# Patient Record
Sex: FEMALE | Race: BLACK OR AFRICAN AMERICAN
[De-identification: names, ages, dates, MRNs, and addresses within clinical notes are randomized per-mention and may not be internally consistent; named-entity substitution may affect disease eponyms.]

---

## 2018-10-19 NOTE — EMERGENCY ROOM REPORT
History of Present Illness


General


Chief Complaint:  Abdominal Pain


Source:  Patient





Present Illness


HPI


22-year-old female patient presents ER complaining of right lower flank pain 3 

days. . Reports first pregnancy. Patient is currently 10 weeks pregnant.  

Denies vaginal discharge or hematuria.  Denies dysuria. Also complaining of left

-sided flank pain during this time. Reports pain worse with long periods of 

walking.  Also complaining of diarrhea.  Reports diarrhea is watery, denies 

blood in diarrhea.  Denies recent travel.  Denies history of GI problems.  

Denies fever, chest pain, shortness of breath. Denies vomiting.  Reports has 

been seen by OB/GYN, states this is her first pregnancy, states his taking 

prenatal vitamins.  Denies other acute complaints.denies headache. Cristiana 

vagina discharge. Denies hematuria. Denies passage of clots of tissue. Denies 

HA. Denies syncope or dizziness. Denies injury or trauma. Denies pain radiating 

down legs. Denies bowel or bladder incontinence.


Allergies:  


Coded Allergies:  


     No Known Allergies (Unverified , 18)





Patient History


Past Medical History:  see triage record


Last Menstrual Period:  2018


Pregnant Now:  Yes


:  1


Para:  0


Reviewed Nursing Documentation:  PMH: Agreed; PSxH: Agreed





Nursing Documentation-PMH


Past Medical History:  No History, Except For





Review of Systems


All Other Systems:  negative except mentioned in HPI





Physical Exam





Vital Signs








  Date Time  Temp Pulse Resp B/P (MAP) Pulse Ox O2 Delivery O2 Flow Rate FiO2


 


10/19/18 12:43 98.7 70 16 111/61 99 Room Air  





 98.8       








Sp02 EP Interpretation:  reviewed, normal


General Appearance:  well appearing, no apparent distress, alert, GCS 15, non-

toxic


Head:  normocephalic, atraumatic


Eyes:  bilateral eye normal inspection, bilateral eye PERRL


ENT:  hearing grossly normal, normal pharynx, no angioedema, normal voice, 

uvula midline, moist mucus membranes


Neck:  full range of motion


Respiratory:  lungs clear, normal breath sounds, no rhonchi, no respiratory 

distress, no accessory muscle use, no wheezing, speaking full sentences


Cardiovascular #1:  regular rate, rhythm, no edema


Gastrointestinal:  non tender, soft, no mass, non-distended, no guarding, no 

rebound, other - negative obturator, negative Rovsing, no tenderness to 

palpation at McBurney's point


Genitourinary:  no CVA tenderness


Musculoskeletal:  back normal, digits/nails normal, gait/station normal, normal 

range of motion, non-tender, no calf tenderness, Corey's Sign negative


Neurologic:  alert, oriented x3, responsive, motor strength/tone normal, SLR 

negative, sensory intact, cerebellar normal, normal gait, speech normal


Psychiatric:  mood/affect normal


Skin:  no rash


Lymphatic:  no adenopathy





Medical Decision Making


PA Attestation


Dr. Cardona is my supervising Physician whom patient management has been 

discussed with.


Diagnostic Impression:  


 Primary Impression:  


 Abdominal pain during pregnancy in first trimester


 Additional Impression:  


 Diarrhea


ER Course


Pt. presents to the ED c/o abdominal pain, left flank pain, and diarrhea, 

patient is 10 weeks pregnant.





Ddx considered but are not limited to UTI, cholelithiasis, cholecystitis, 

pancreatitis, appendicitis, diverticulitis, constipation, drug use, threatened 

, miscarriage, ectopic.


Begin abdominal pain workup. Provided patient with pain medication.





Vital signs: are WNL, pt. is afebrile





ORDERS:  CBC, CMP, Lipase, UA,  US, IV fluids, and medication.





ER COURSE:


physical exam benign, patient resting comfortably in bed, no acute distress, 

nontoxic appearing.





CBC and CMP normal H&H, no elevation in WBCs


Lipase WNL


UA negative


BHCG 77181


B positive with negative antibody


Discuss results with patient





Abdominal US negative, appendix WNL, kidneys unremarkable, low suspicion for 

appendicitis or nephrolithiasis or hydronephrosis.


pelvic US shows IUP, 





Back pain may be MSK in nature, take Tylenol for pain symptoms, advised on rest

, ice and heat. F/u with PCP for further treatment and referral.


Abdominal pain symptoms may be related to diarrhea. Drink plenty of fluids. 

Take Tylenol for pain symptoms. Does not require abx at this time.





Patient reports relief of pain symptoms while in the ER.


ER precautions given.


F/u with OBGYN.


Do not take Ibuprofen/ Motrin for pain symptoms.


patient resting comfortably in no acute distress, nontoxic appearing, okay for 

outpatient treatment.





DISCHARGE: 


Rx provided for Tylenol





At this time pt. is stable for d/c to home. Patient resting comfortably, in no 

acute distress, nontoxic appearing, talking without difficulty.


Rx provided to patient. Patient to take medications as instructed


Will provide with patient care instructions and any necessary prescriptions.


Care plan and follow-up instructions provided.  Patient instructed to follow-up 

with primary care provider in 3 - 5 days.


Patient questions asked and answered.


Patient reports understanding and agreement to treatment plan.


ER precautions given. Patient instructed to return to ER immediately for any 

new or worsening of symptoms including but not limited to increasing SOB, 

persistent fever, worsening of pain symptoms, intractable vomiting, blood in 

stool, urine, and/or emesis.





- Please note that this Emergency Department Report was dictated using [employer] technology software, occasionally this can lead to 

erroneous entry secondary to interpretation by the dictation equipment.





Labs








Test


  10/19/18


13:20


 


White Blood Count


  9.7 K/UL


(4.8-10.8)


 


Red Blood Count


  5.33 M/UL


(4.20-5.40)


 


Hemoglobin


  13.4 G/DL


(12.0-16.0)


 


Hematocrit


  41.5 %


(37.0-47.0)


 


Mean Corpuscular Volume 78 FL (80-99) 


 


Mean Corpuscular Hemoglobin


  25.1 PG


(27.0-31.0)


 


Mean Corpuscular Hemoglobin


Concent 32.3 G/DL


(32.0-36.0)


 


Red Cell Distribution Width


  14.2 %


(11.6-14.8)


 


Platelet Count


  330 K/UL


(150-450)


 


Mean Platelet Volume


  6.5 FL


(6.5-10.1)


 


Neutrophils (%) (Auto)


  72.9 %


(45.0-75.0)


 


Lymphocytes (%) (Auto)


  18.2 %


(20.0-45.0)


 


Monocytes (%) (Auto)


  6.8 %


(1.0-10.0)


 


Eosinophils (%) (Auto)


  1.2 %


(0.0-3.0)


 


Basophils (%) (Auto)


  1.0 %


(0.0-2.0)


 


Urine Color Pale yellow 


 


Urine Appearance Clear 


 


Urine pH 8 (4.5-8.0) 


 


Urine Specific Gravity


  1.015


(1.005-1.035)


 


Urine Protein


  Negative


(NEGATIVE)


 


Urine Glucose (UA)


  Negative


(NEGATIVE)


 


Urine Ketones


  Negative


(NEGATIVE)


 


Urine Blood


  Negative


(NEGATIVE)


 


Urine Nitrite


  Negative


(NEGATIVE)


 


Urine Bilirubin


  Negative


(NEGATIVE)


 


Urine Urobilinogen


  Normal MG/DL


(0.0-1.0)


 


Urine Leukocyte Esterase


  Negative


(NEGATIVE)


 


Sodium Level


  139 MMOL/L


(136-145)


 


Potassium Level


  4.2 MMOL/L


(3.5-5.1)


 


Chloride Level


  105 MMOL/L


()


 


Carbon Dioxide Level


  22 MMOL/L


(21-32)


 


Anion Gap


  12 mmol/L


(5-15)


 


Blood Urea Nitrogen 5 mg/dL (7-18) 


 


Creatinine


  0.5 MG/DL


(0.55-1.30)


 


Estimat Glomerular Filtration


Rate > 60 mL/min


(>60)


 


Glucose Level


  90 MG/DL


()


 


Calcium Level


  9.3 MG/DL


(8.5-10.1)


 


Total Bilirubin


  0.2 MG/DL


(0.2-1.0)


 


Aspartate Amino Transf


(AST/SGOT) 26 U/L (15-37) 


 


 


Alanine Aminotransferase


(ALT/SGPT) 48 U/L (12-78) 


 


 


Alkaline Phosphatase


  68 U/L


()


 


Total Protein


  7.1 G/DL


(6.4-8.2)


 


Albumin


  3.3 G/DL


(3.4-5.0)


 


Globulin 3.8 g/dL 


 


Albumin/Globulin Ratio 0.9 (1.0-2.7) 


 


Lipase


  76 U/L


()


 


Human Chorionic Gonadotropin,


Quant 31866 mIU/mL


(1-6)








CT/MRI/US Diagnostic Results


CT/MRI/US Diagnostic Results :  


   Imaging Test Ordered:  pelvic and abdominal ultrasound


   Impression


IUP





no torsion





Abdominal US WNL


No kidney stones, no hydronephrosis





per US technician





Last Vital Signs








  Date Time  Temp Pulse Resp B/P (MAP) Pulse Ox O2 Delivery O2 Flow Rate FiO2


 


10/19/18 12:43 98.7 70 16 111/61 99 Room Air  





 98.8       








Status:  improved


Disposition:  HOME, SELF-CARE


Condition:  Stable


Scripts


Acetaminophen* (TYLENOL EXTRA STRENGTH*) 500 Mg Tablet


500 MG ORAL Q8H PRN for Prn Headache/Temp > 101, #30 TAB 0 Refills


   Prov: Vijay Zayas         10/19/18


Referrals:  


NON PHYSICIAN (PCP)


Patient Instructions:  Abdominal Pain During Pregnancy, Back Pain, Adult, Easy-

to-Read, Diarrhea, Adult, Easy-to-Read





Additional Instructions:  


Followup with OBGYN in 1-2 days.


Followup with PCP.


Take medications as directed. Take Tylenol for pain, do not take Ibuprofen.


Drink plenty of fluids to prevent dehydration.


Patient questions asked and answered.


ER precautions given, patient instructed to return to ER immediately for any 

new or worsening of symptoms including but not limited to chest pain, SOB, 

intractable vomiting, profuse vaginal bleeding, abdominal pain.





[facility] [place] [address]


Blood: B positive with negative antibody











Vijay Zayas Oct 19, 2018 13:15

## 2018-10-19 NOTE — DIAGNOSTIC IMAGING REPORT
Indication: Pelvic pain, positive pregnancy test

 

Technique: Transabdominal and transvaginal images of the pelvis. Doppler

interrogation of the ovaries and adnexa.

 

Comparison: none

 

Findings: Uterus measures 12.8 cm length by 5.3 cm AP. Within the endometrium, there

is a gestational sac. There is a yolk sac. There is a fetal pole with a crown-rump

length of 26 mm, corresponding to an assessment gestational age of 9 weeks 2 days.

Estimated gestational age by dates is 10 weeks 3 days. Estimated date of delivery is

5/19/2019 there is positive fetal heart activity, fetal heart rate 163 bpm. No

subchorionic hemorrhage demonstrated. The cervix is closed, endocervical canal

measuring 3.2 cm. Tiny cervical nabothian cyst is noted. There is trace free

cul-de-sac fluid. Left ovary measures 4.4 cm length. Right ovary measures 3.8 cm

length. Both ovaries demonstrate normal flow on Doppler imaging. No myometrial

abnormality 

 

Impression: 9 week 2 day, by crown-rump length measurement, single live intrauterine

pregnancy. No unusual features

 

Trace free cul-de-sac fluid, presumably physiologic

 

Incidental finding small cervical nabothian cyst

## 2018-10-19 NOTE — DIAGNOSTIC IMAGING REPORT
Indication: Abdominal pain

 

Technique: Gray-scale and duplex images of the upper abdomen were obtained. Doppler

interrogation of the hepatic vessels

 

Comparison: none

 

Findings: Gallbladder is unremarkable, without stones, wall thickening, nor

pericholecystic fluid.  Sonographic Chavez's sign is negative.  Common bile duct

measures 4 mm in diameter.  No intrahepatic biliary ductal dilatation.  Liver

demonstrates normal echogenicity, no focal abnormality.   Portal vein and hepatic

veins are patent.   Pancreas is unremarkable.    Spleen is unremarkable.  Left kidney

measures 11.6 cm in length.  Right kidney measures 11.2 cm length.  Both kidneys

demonstrate normal echogenicity.  There is no hydronephrosis.   No focal abnormality

. Non-aneurysmal abdominal aorta .

 

Impression: Negative

## 2018-11-20 NOTE — EMERGENCY ROOM REPORT
History of Present Illness


General


Chief Complaint:  Headache


Source:  Patient, Medical Record





Present Illness


HPI


22-year-old female presents to the emergency department complaining of 

persistent cough, subjective fevers, 6 out of 10 in severity sore throat as 

well as headaches 2 days.  Patient reports ill contact was her  and his 

friends who had similar symptoms several days prior.  Patient states she is up-

to-date with vaccinations except for this year's flu vaccination.  Patient also 

reports that she is 14 weeks pregnant is .  She denies nausea or vomiting.  

Patient reports that when she coughs she has pain in her rib cage as well as 

abdomen.  Denies vaginal bleeding or discharge.  She denies photophobia, neck 

pain or stiffness, ear pain or changes in vision.  He denies unilateral 

weaknesses or paresthesia.


Allergies:  


Coded Allergies:  


     No Known Allergies (Unverified , 18)





Patient History


Past Medical History:  see triage record


Past Surgical History:  none


Pertinent Family History:  none


Last Menstrual Period:  2018


Pregnant Now:  Yes


Reviewed Nursing Documentation:  PMH: Agreed; PSxH: Agreed





Nursing Documentation-PMH


Past Medical History:  No History, Except For





Review of Systems


All Other Systems:  negative except mentioned in HPI





Physical Exam





Vital Signs








  Date Time  Temp Pulse Resp B/P (MAP) Pulse Ox O2 Delivery O2 Flow Rate FiO2


 


18 14:43 98.8 96 15 113/71 100 Room Air  








Sp02 EP Interpretation:  reviewed, normal


General Appearance:  no apparent distress, alert, GCS 15, non-toxic


Head:  normocephalic, atraumatic


Eyes:  bilateral eye normal inspection, bilateral eye PERRL


ENT:  hearing grossly normal, normal voice


Neck:  full range of motion


Respiratory:  chest non-tender, lungs clear, normal breath sounds, no 

respiratory distress, no accessory muscle use, no wheezing, speaking full 

sentences


Cardiovascular #1:  regular rate, rhythm


Gastrointestinal:  non tender, soft


Rectal:  deferred


Genitourinary:  normal inspection, no CVA tenderness


Musculoskeletal:  back normal, gait/station normal, normal range of motion, non-

tender


Neurologic:  alert, oriented x3, responsive, motor strength/tone normal, 

sensory intact, speech normal, grossly normal


Psychiatric:  judgement/insight normal


Skin:  normal color, no rash, warm/dry, well hydrated


Lymphatic:  no adenopathy





Medical Decision Making


PA Attestation


Dr. yoder is my supervising Physician whom patient management has been 

discussed with.


Diagnostic Impression:  


 Primary Impression:  


 Upper respiratory infection, viral


 Additional Impression:  


 Headache


 Qualified Codes:  R51 - Headache


ER Course


22-year-old female presents to the emergency department complaining of 

persistent cough, subjective fevers, 6 out of 10 in severity sore throat as 

well as headaches 2 days.  Patient reports ill contact was her  and his 

friends who had similar symptoms several days prior.  Patient states she is up-

to-date with vaccinations except for this year's flu vaccination.  Patient also 

reports that she is 14 weeks pregnant is .  She denies nausea or vomiting. 

She reports nasal congestion and rhinorrhea as well.   Patient reports that 

when she coughs she has pain in her rib cage as well as abdomen.  Denies 

vaginal bleeding or discharge.  She denies photophobia, neck pain or stiffness, 

ear pain or changes in vision.  He denies unilateral weaknesses or paresthesia.





Ddx considered but are not limited to URI, pneumonia, PE, strep pharyngitis, 

meningitis, influenza, OM/OE, threatened   just to name a few. 





Vital signs: Pt. is afebrile, the remaining VS are WNL





H&PE are most consistent with  Viral respiratory infection-  will treat 

conservatively  - no meningeal signs,  Lungs are clear and oropharynx is not 

involved, no evidence of bacterial infection at this time. 





ORDERS


-UA: unremarkable





ED INTERVENTIONS: 


-Tylenol PO








-I do not identify an emergent condition at this time. With current presentation

,  pt. is stable for close outpatient follow up and conservative treatment.  D/

w pt. to return promptly to ED with worsening or new symptoms.- Pt. verbalizes' 

understanding and agreement with proposed treatment plan.





DISCHARGE: At this time pt. is stable for d/c to home. Will provide printed 

patient care instructions, and any necessary prescriptions. Care plan and 

follow up instructions have been discussed with the patient prior to discharge.





Labs








Test


  18


15:00


 


Urine Color Yellow 


 


Urine Appearance Clear 


 


Urine pH 7 (4.5-8.0) 


 


Urine Specific Gravity


  1.010


(1.005-1.035)


 


Urine Protein


  Negative


(NEGATIVE)


 


Urine Glucose (UA)


  Negative


(NEGATIVE)


 


Urine Ketones 4+ (NEGATIVE) 


 


Urine Blood


  Negative


(NEGATIVE)


 


Urine Nitrite


  Negative


(NEGATIVE)


 


Urine Bilirubin


  Negative


(NEGATIVE)


 


Urine Urobilinogen


  1 MG/DL


(0.0-1.0)


 


Urine Leukocyte Esterase


  Negative


(NEGATIVE)











Last Vital Signs








  Date Time  Temp Pulse Resp B/P (MAP) Pulse Ox O2 Delivery O2 Flow Rate FiO2


 


18 14:43 98.8 96 15 113/71 100 Room Air  








Disposition:  HOME, SELF-CARE


Condition:  Stable


Scripts


Acetaminophen* (TYLENOL EXTRA STRENGTH*) 500 Mg Tablet


500 MG ORAL Q6H PRN for Mild Pain/Temp > 100.5, #20 TAB 0 Refills


   Prov: Sabine Wen         18 


Guaifenesin (Guaifenesin) 1,200 Mg Tab.er.12h


1200 MG PO Q12HR, #20 TAB


   Prov: Sabine Wen         18


Referrals:  


NON PHYSICIAN (PCP)


Patient Instructions:  Upper Respiratory Infection, Adult, Easy-to-Read





Additional Instructions:  


Take medications as directed. 





 ** Follow up with a Primary Care Provider or OBGYN  in 3 days, even if your 

symptoms have resolved. ** 


--Please review list of primary care clinics, if you do not already have a 

primary care provider





Return sooner to ED if new symptoms occur, or current symptoms become worse. 











- Please note that this Emergency Department Report was dictated using [employer] technology software, occasionally this can lead to 

erroneous entry secondary to interpretation by the dictation equipment.











Sabine Wen 2018 15:22

## 2019-01-06 ENCOUNTER — HOSPITAL ENCOUNTER (EMERGENCY)
Dept: HOSPITAL 72 - EMR | Age: 24
Discharge: HOME | End: 2019-01-06
Payer: COMMERCIAL

## 2019-01-06 VITALS — BODY MASS INDEX: 37.21 KG/M2 | HEIGHT: 63 IN | WEIGHT: 210 LBS

## 2019-01-06 VITALS — SYSTOLIC BLOOD PRESSURE: 112 MMHG | DIASTOLIC BLOOD PRESSURE: 74 MMHG

## 2019-01-06 VITALS — SYSTOLIC BLOOD PRESSURE: 110 MMHG | DIASTOLIC BLOOD PRESSURE: 70 MMHG

## 2019-01-06 DIAGNOSIS — Z3A.21: ICD-10-CM

## 2019-01-06 DIAGNOSIS — R10.30: ICD-10-CM

## 2019-01-06 DIAGNOSIS — O26.892: Primary | ICD-10-CM

## 2019-01-06 PROCEDURE — 99282 EMERGENCY DEPT VISIT SF MDM: CPT

## 2019-01-06 NOTE — NUR
ER Nurse Note:



PT SITTING PEACEFULLY IN BED IN NAD. AOX4. DISCHARGE PAPERWORK EXPLAINED TO PT. 
PT VERBALIZES UNDERSTANDING AND DENIES ANY QUESTIONS AT THIS TIME. DISCHARGE 
PAPERWORK GIVEN TO PT AND ID WRISTBAND REMOVED. PT WALKED OUT OF ER WITH STEADY 
GAIT WITH ALL BELONGINGS.

## 2019-01-06 NOTE — NUR
ED Nurse Note:



PT WALKED IN TO ER TODAY FROM HOME. AOX4. PT C/O LOWER ABDOMINAL PAIN, 6/10, X 
THIS AM. PT ALSO C/O BURNING SENSATION WHILE URINATING X THIS AM. PT DENIES ANY 
VAGINAL DISCHARGE, SWELLING, OR BLEEDING. PT STATES SHE IS 21 WEEKS PREGNANT. 
LAST APPT WITH OB 12/7/18.

## 2019-01-06 NOTE — EMERGENCY ROOM REPORT
History of Present Illness


General


Chief Complaint:  Female Urogenital Problems


Source:  Patient





Present Illness


HPI


She is a 23-year-old female  at approximately 21 weeks who presented after 

increased lower abdominal discomfort.  Patient had gradual onset of symptoms.  

Here she reports having crampy sensation to the lower abdomen.  This has been 

present since she woke up this morning.  She denies any associated dysuria.  

She denies any prior bleeding or leakage of fluid.  She states that she has 

been having normal fetal movement.  Ultrasound previously had been normal at 

her last OB visit.  She denies any significant vomiting.  She had not been 

having any diarrhea.


Allergies:  


Coded Allergies:  


     No Known Allergies (Unverified , 18)





Patient History


Past Medical History:  see triage record


Last Menstrual Period:  18


Pregnant Now:  No


Reviewed Nursing Documentation:  PMH: Agreed; PSxH: Agreed





Nursing Documentation-PMH


Past Medical History:  No History, Except For





Review of Systems


All Other Systems:  negative except mentioned in HPI





Physical Exam





Vital Signs








  Date Time  Temp Pulse Resp B/P (MAP) Pulse Ox O2 Delivery O2 Flow Rate FiO2


 


19 09:02 98.1 75 20 106/69 98 Room Air  








General Appearance:  well appearing, no apparent distress, alert, GCS 15


Head:  normocephalic, atraumatic


ENT:  hearing grossly normal, normal voice


Neck:  full range of motion, supple


Respiratory:  lungs clear, normal breath sounds, no respiratory distress, 

speaking full sentences


Cardiovascular #1:  normal inspection, normal peripheral pulses, regular rate, 

rhythm, no edema


Gastrointestinal:  normal inspection, no mass, other - gravid uterus


Musculoskeletal:  normal inspection, back normal, no calf tenderness


Neurologic:  normal inspection, alert, oriented x3, normal gait


Psychiatric:  mood/affect normal


Skin:  no rash





Medical Decision Making


Diagnostic Impression:  


 Primary Impression:  


 Intrauterine pregnancy


ER Course


.   Present for lower abdominal pain.  Differential diagnosis include was not 

limited to appendicitis, gastroenteritis, premature labor, ruptured ectopic 

pregnancy among others.  Patient has a benign exam and does not appear to 

require any further imaging or laboratory testing at this time.  That 

ultrasound was performed and shows evidence of intrauterine pregnancy with 

adequate fetal heart tones.  Patient denies any recent trauma.  She was noted 

to not have any vaginal bleeding.  Patient was advised that she would need to 

follow-up with labor and delivery and was offered transfer which she declined.  

Patient stated that she would follow-up with labor and delivery.  Patient does 

not appear to be in any respiratory distress and does not appear to be in 

active labor at this time.Appears to be stable for discharge.





Last Vital Signs








  Date Time  Temp Pulse Resp B/P (MAP) Pulse Ox O2 Delivery O2 Flow Rate FiO2


 


19 09:05 98.3 70 18 110/70 98 Room Air  








Status:  improved


Disposition:  HOME, SELF-CARE


Condition:  Stable


Patient Instructions:  Abdominal Pain During Pregnancy





Additional Instructions:  


Follow up with OB gyn for recheck.











Noman Hawkins MD 2019 09:50